# Patient Record
Sex: MALE | Race: WHITE | NOT HISPANIC OR LATINO | Employment: FULL TIME | URBAN - METROPOLITAN AREA
[De-identification: names, ages, dates, MRNs, and addresses within clinical notes are randomized per-mention and may not be internally consistent; named-entity substitution may affect disease eponyms.]

---

## 2024-06-06 ENCOUNTER — NURSE TRIAGE (OUTPATIENT)
Dept: NURSING | Facility: CLINIC | Age: 19
End: 2024-06-06

## 2024-06-06 ENCOUNTER — OFFICE VISIT (OUTPATIENT)
Dept: FAMILY MEDICINE | Facility: CLINIC | Age: 19
End: 2024-06-06

## 2024-06-06 ENCOUNTER — ANCILLARY PROCEDURE (OUTPATIENT)
Dept: GENERAL RADIOLOGY | Facility: CLINIC | Age: 19
End: 2024-06-06
Attending: PHYSICIAN ASSISTANT

## 2024-06-06 ENCOUNTER — THERAPY VISIT (OUTPATIENT)
Dept: PHYSICAL THERAPY | Facility: CLINIC | Age: 19
End: 2024-06-06
Attending: PHYSICIAN ASSISTANT

## 2024-06-06 VITALS
RESPIRATION RATE: 16 BRPM | SYSTOLIC BLOOD PRESSURE: 119 MMHG | OXYGEN SATURATION: 97 % | TEMPERATURE: 98.6 F | HEART RATE: 67 BPM | DIASTOLIC BLOOD PRESSURE: 71 MMHG | WEIGHT: 150 LBS
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
## 2024-06-06 DIAGNOSIS — M79.641 PAIN OF RIGHT HAND: ICD-10-CM

## 2024-06-06 DIAGNOSIS — M25.521 RIGHT ELBOW PAIN: Primary | ICD-10-CM

## 2024-06-06 PROCEDURE — 97110 THERAPEUTIC EXERCISES: CPT | Mod: GP

## 2024-06-06 PROCEDURE — 73080 X-RAY EXAM OF ELBOW: CPT | Mod: TC | Performed by: RADIOLOGY

## 2024-06-06 PROCEDURE — 99203 OFFICE O/P NEW LOW 30 MIN: CPT | Performed by: PHYSICIAN ASSISTANT

## 2024-06-06 PROCEDURE — 97161 PT EVAL LOW COMPLEX 20 MIN: CPT | Mod: GP

## 2024-06-06 RX ORDER — NAPROXEN 500 MG/1
500 TABLET ORAL 2 TIMES DAILY WITH MEALS
Qty: 28 TABLET | Refills: 0 | Status: SHIPPED | OUTPATIENT
Start: 2024-06-06 | End: 2024-06-20

## 2024-06-06 ASSESSMENT — ACTIVITIES OF DAILY LIVING (ADL)
UEFI_TOTAL_SCORE: 32
USING_TOOLS_OR_APPLIANCES: EXTREME DIFFICULTY
OPENING_A_JAR: NO DIFFICULTY
THROWING_A_BALL: EXTREME DIFFICULTY
TYING_OR_LACING_SHOES: MODERATE DIFFICULTY
DRESS: MODERATE DIFFICULTY
LIFTING_A_BAG_OF_GROCERIES_TO_WAIST_LEVEL: EXTREME DIFFICULTY
PLEASE_INDICATE_YOR_PRIMARY_REASON_FOR_REFERRAL_TO_THERAPY:: ELBOW
DOING_UP_BUTTONS: NO DIFFICULTY
OPENING_DOORS: NO DIFFICULTY
LAUNDERING_CLOTHES: MODERATE DIFFICULTY
CLEANING: A LITTLE BIT OF DIFFICULTY
DRIVING: A LITTLE BIT OF DIFFICULTY
SLEEPING: A LITTLE BIT OF DIFFICULTY
UEFI_TOTAL_SCORE/80: .4
WASHING_YOUR_HAIR_OR_SCALP: EXTREME DIFFICULTY
PREPARING_FOOD: QUITE A BIT OF DIFFICULTY
PLACING_AN_OBJECT_ONTO,_OR_REMOVING_IT_FROM_AN_OVERHEAD_SHELF: EXTREME DIFFICULTY
VACUUMING,_SWEEPING,_OR_RAKING: A LITTLE BIT OF DIFFICULTY
ANY_OF_YOUR_USUAL_WORK,_HOUSEWORK_OR_SCHOOL_ACTIVITIES: EXTREME DIFFICULTY
PUSHING_UP_ON_YOUR_HANDS: EXTREME DIFFICULTY
YOUR_USUAL_HOBBIES,_RECREATIONAL_OR_SPORTING_ACTIVITIES: EXTREME DIFFICULTY
CARRYING_A_SMALL_SUITCASE_WITH_YOUR_AFFECTED_LIMB: QUITE A BIT OF DIFFICULTY

## 2024-06-06 NOTE — LETTER
June 6, 2024      Trevor Merida  [address] [address]        To Whom It May Concern:    Trevor Merida was seen in our clinic due to overuse injury.  He may return to work with the following instructions:    No lift, push, pull greater than 5 lbs with the R upper extremity.  Avoid repetative, lift, push pull of the R upper extremity.    These restrictions are in place for 2 weeks.        Sincerely,      Mary Kay Frias

## 2024-06-06 NOTE — PROGRESS NOTES
"PHYSICAL THERAPY EVALUATION  Type of Visit: Evaluation    See electronic medical record for Abuse and Falls Screening details.    Subjective       Presenting condition or subjective complaint: cant straighten the arm, unbearable pain in upper forearm. Onset about 2 days ago while at work. He was carrying more weight at work over the past two days. He was feeling very exhausted/fatigue/tightness in his arm for two days at work. Yesterday while working he started to feel pain in forearm near elbow. Pain continued to get worse that day and he could not straighten his elbow without extreme sharp pain. Pain is aggravated with elbow ext. Pain improved with avoiding elbow ext.   Date of onset: 06/06/24    Relevant medical history:     Dates & types of surgery:      Prior diagnostic imaging/testing results: X-ray     Prior therapy history for the same diagnosis, illness or injury:            Patient goals for therapy: straighten the arm    Pain assessment: Pain present  See objective evaluation for additional pain details     Objective   PAIN: Pain Level at Rest: 0/10  Pain Level with Use: 10/10  Pain Location: elbow  Pain Quality: Aching, Dull, and Sharp  Pain Frequency: intermittent  POSTURE: WFL  ROM:  elbow flexion L UE WFL : R: lacking 75 d  with sharp pain and end range empty end feel to 130d feels tight at end range; pronation/supination WFL pain on R forearm with repeated movement;    STRENGTH: WFL  Pain free and symmetrical strength grossly 4+/5 elbow flex/ext, wrist flex/ext, pronation/supination   strength; L 130lb, R 120 pain free     PALPATION:  Grossly WFL slight \"discomfort at anterior elbow near proximal radio-ulnar joint   JOINT MOBILITY: WFL, proximal radial ulnar joint pain free  CERVICAL SCREEN: WFL  SHOULDER SCREEN: WFL   OBSERVATION: with elbow flexion; potential mild pop eye formation at biceps observed on R UE, with bicipital bulge observed more proximally compared to L UE     Assessment & Plan "   CLINICAL IMPRESSIONS  Medical Diagnosis: Right elbow pain  Overuse injury    Treatment Diagnosis: Right elbow pain  Overuse injury   Impression/Assessment: Patient is a 18 year old male with right arm complaints.  The following significant findings have been identified: Pain, Decreased ROM/flexibility, and Decreased activity tolerance. These impairments interfere with their ability to perform self care tasks, recreational activities, household mobility, and community mobility as compared to previous level of function.     Clinical Decision Making (Complexity):  Clinical Presentation: Stable/Uncomplicated  Clinical Presentation Rationale: based on medical and personal factors listed in PT evaluation  Clinical Decision Making (Complexity): Low complexity    PLAN OF CARE  Treatment Interventions:  Interventions: Gait Training, Manual Therapy, Neuromuscular Re-education, Therapeutic Activity, Therapeutic Exercise, Self-Care/Home Management    Long Term Goals     PT Goal 1  Goal Identifier: LTG 1  Goal Description: pt will straighten R elbow pain free  Rationale: to maximize safety and independence with performance of ADLs and functional tasks;to maximize safety and independence within the community;to maximize safety and independence with self cares  Target Date: 08/15/24      Frequency of Treatment: 1x/week taper as able  Duration of Treatment: 10 weeks    Recommended Referrals to Other Professionals: Physical Therapy  Education Assessment:   Learner/Method: Patient;Listening    Risks and benefits of evaluation/treatment have been explained.   Patient/Family/caregiver agrees with Plan of Care.     Evaluation Time:     PT Eval, Low Complexity Minutes (60493): 25     Signing Clinician: Mable Martinez PT

## 2024-06-06 NOTE — PATIENT INSTRUCTIONS
Ice, naproxen for pain and any swelling.    Follow up with physical therapy for ongoing care.    Encourage range of motion.  If not improving as expected in the next 2 weeks follow-up with orthopedics.

## 2024-06-06 NOTE — PROGRESS NOTES
Assessment & Plan     Right elbow pain  Pt has impaired ROM in extension due to pain.  No trauma, XR negative for fx, effusion.  SETH is consistent with overuse injury, but exam is not suggestive of medial or lateral epicondylitis and there is no joint laxity on exam. He has not had previous injury that would suggest loose body for mechanical block.  Recommend relative rest. PT for range of motion. Work restrictions given.    Referral to orthopedics for ongoing evaluation.    Naproxen for pain.    Pt reports he will be moving on to a new community/state, not certain where he will receive care.      - XR Elbow Right G/E 3 Views  - Orthopedic  Referral  - Physical Therapy  Referral  - naproxen (NAPROSYN) 500 MG tablet  Dispense: 28 tablet; Refill: 0    Pain of right hand  Exam consistent with mild contusion.  Recommend ice, relative rest.  Reassured of negative XR, no sign of fracture. - XR Hand Right G/E 3 Views  - naproxen (NAPROSYN) 500 MG tablet  Dispense: 28 tablet; Refill: 0    Overuse injury    - Orthopedic  Referral  - Physical Therapy  Referral     36 minutes spent by me on the date of the encounter doing review of test results, interpretation of tests, patient visit, and documentation         ADAL Barillas [facility]    Mary Murray is a 18 year old male who presents to clinic today for the following health issues:  Chief Complaint   Patient presents with    Work Comp     Patient states he has been working by himself this week. Patient states he had to carry heavy equipment by himself . He started to get sore on his right arm. Patient states yesterday he was doing regular work and suddenly was not able to extend his right arm because of extreme pain also during weekend got hit on his knuckle and seems a little swollen. Patient also has had pain in shoulder when trying to move arm.     HPI  Pt is a 18 year old male,  presents to urgent care with concerns re: R arm and hand pain.    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    2 days ago, had to carry heavy equipment a long distanced, increased weight than typical as he was working alone rather than with a partner.    By the end of the day was more fatigued in the arms, yesterday was fine until noon, but then as the day went on increased pain.    Able to continue working initially, now increased pain, with severe pain with  range of motion greater than 90 degrees.    Progressive worse through the day and quite painful last night.    Ice, rest.  This morning increased pain with any ROM.          Review of Systems  Constitutional, HEENT, cardiovascular, pulmonary, gi and gu systems are negative, except as otherwise noted.      Objective    /71   Pulse 67   Temp 98.6  F (37  C)   Resp 16   Wt 68 kg (150 lb)   SpO2 97%   Physical Exam  Vitals reviewed.   Musculoskeletal:      Right elbow: No swelling, deformity or effusion. Decreased range of motion (markedly decreased with ROM limited to , unable to fully extend). No tenderness.      Left elbow: Normal.      Right forearm: No swelling, edema, deformity, tenderness or bony tenderness.      Left forearm: Normal.      Right wrist: Normal.      Left wrist: Normal.      Right hand: Swelling (over 2,3rd metacarpal heads with mild ecchymosis here and TTP) present. Normal range of motion. Normal strength. Normal capillary refill. Normal pulse.      Left hand: Normal.      Comments: No elbow effusion, no TTP of the bony prominences of the elbow including the medial and lateral epicondyles.    No pain with resistive wrist  flexion or extension. No laxity with varus or valgus stress test.         Results for orders placed or performed in visit on 06/06/24   XR Hand Right G/E 3 Views     Status: None    Narrative    EXAM: XR HAND RIGHT G/E 3 VIEWS  [facility]  DATE: 6/6/2024    INDICATION:  Pain of right hand  COMPARISON: None.      Impression    IMPRESSION: Normal joint spaces and alignment. No acute fracture. There is likely an old healed fracture of the ulnar base of the proximal phalanx of the thumb.   Results for orders placed or performed in visit on 06/06/24   XR Elbow Right G/E 3 Views     Status: None    Narrative    EXAM: XR ELBOW RIGHT G/E 3 VIEWS  [facility]  DATE: 6/6/2024    INDICATION:  Right elbow pain  COMPARISON: None.      Impression    IMPRESSION: Normal joint spaces and alignment. No fracture or joint effusion.

## 2024-06-06 NOTE — TELEPHONE ENCOUNTER
Patient was walking and carrying too much at one time. Yesterday at one time he was not able to straighten his elbow and was having sharp pain. This morning he is unable to straighten the arm all the way or bend past 90 degrees as the pain is unbearable. Patient has treated with ice and slept with it bent at 90 degrees.  Denies swelling, redness, tenderness to touch  Protocol recommends   Address and hours given to closest .  Wanda Morelos RN   06/06/24 7:53 AM  [facility] Nurse Advisor    Reason for Disposition   SEVERE pain (e.g., excruciating, unable to do any normal activities)    Additional Information   Negative: Shock suspected (e.g., cold/pale/clammy skin, too weak to stand, low BP, rapid pulse)   Negative: Similar pain previously and it was from 'heart attack'   Negative: Similar pain previously from 'angina' and not relieved by nitroglycerin   Negative: Sounds like a life-threatening emergency to the triager   Negative: Followed an injury to arm   Negative: Chest pain   Negative: Wound looks infected   Negative: Elbow pain is main symptom   Negative: Hand or wrist pain is main symptom   Negative: Difficulty breathing or unusual sweating (e.g., sweating without exertion)   Negative: Chest pain lasting longer than 5 minutes   Negative: Age > 40 and no obvious cause for pain, pain still present even when not moving the arm   Negative: Fever and red area (or area very tender to touch)   Negative: Swollen joint and fever   Negative: Entire arm is swollen   Negative: Patient sounds very sick or weak to the triager    Protocols used: Arm Pain-A-[place]

## 2024-07-28 ENCOUNTER — HEALTH MAINTENANCE LETTER (OUTPATIENT)
Age: 19
End: 2024-07-28

## 2025-08-10 ENCOUNTER — HEALTH MAINTENANCE LETTER (OUTPATIENT)
Age: 20
End: 2025-08-10